# Patient Record
Sex: MALE | Race: WHITE | NOT HISPANIC OR LATINO | Employment: UNEMPLOYED | URBAN - METROPOLITAN AREA
[De-identification: names, ages, dates, MRNs, and addresses within clinical notes are randomized per-mention and may not be internally consistent; named-entity substitution may affect disease eponyms.]

---

## 2018-08-23 ENCOUNTER — HOSPITAL ENCOUNTER (INPATIENT)
Facility: HOSPITAL | Age: 48
LOS: 1 days | Discharge: HOME | End: 2018-08-24
Attending: HOSPITALIST | Admitting: INTERNAL MEDICINE

## 2018-08-23 PROBLEM — B19.10 HEPATITIS B: Status: ACTIVE | Noted: 2018-08-23

## 2018-08-23 PROBLEM — Z72.0 TOBACCO ABUSE: Status: ACTIVE | Noted: 2018-08-23

## 2018-08-23 PROBLEM — B16.9 ACUTE HEPATITIS B: Status: ACTIVE | Noted: 2018-08-23

## 2018-08-23 PROBLEM — R91.1 LUNG NODULE: Status: ACTIVE | Noted: 2018-08-23

## 2018-08-23 PROBLEM — R74.8 ELEVATED LIVER ENZYMES: Status: ACTIVE | Noted: 2018-08-23

## 2018-08-23 PROCEDURE — 99220 PR INITIAL OBSERVATION CARE/DAY 70 MINUTES: CPT | Performed by: HOSPITALIST

## 2018-08-23 PROCEDURE — G0378 HOSPITAL OBSERVATION PER HR: HCPCS

## 2018-08-23 RX ORDER — SODIUM CHLORIDE 0.9 % (FLUSH) 0.9 %
1-10 SYRINGE (ML) INJECTION AS NEEDED
Status: DISCONTINUED | OUTPATIENT
Start: 2018-08-23 | End: 2018-08-24 | Stop reason: HOSPADM

## 2018-08-23 RX ORDER — ONDANSETRON 2 MG/ML
4 INJECTION INTRAMUSCULAR; INTRAVENOUS EVERY 6 HOURS PRN
Status: DISCONTINUED | OUTPATIENT
Start: 2018-08-23 | End: 2018-08-24 | Stop reason: HOSPADM

## 2018-08-23 RX ORDER — NICOTINE 21 MG/24HR
1 PATCH, TRANSDERMAL 24 HOURS TRANSDERMAL EVERY 24 HOURS
Status: DISCONTINUED | OUTPATIENT
Start: 2018-08-23 | End: 2018-08-24 | Stop reason: HOSPADM

## 2018-08-23 RX ORDER — ONDANSETRON 4 MG/1
4 TABLET, FILM COATED ORAL EVERY 6 HOURS PRN
Status: DISCONTINUED | OUTPATIENT
Start: 2018-08-23 | End: 2018-08-24 | Stop reason: HOSPADM

## 2018-08-23 NOTE — PLAN OF CARE
Problem: Fluid Volume Deficit (Adult)  Goal: Optimal Fluid Balance  Outcome: Ongoing (interventions implemented as appropriate)   08/23/18 1747   Fluid Volume Deficit (Adult)   Optimal Fluid Balance achieves outcome

## 2018-08-23 NOTE — NURSING NOTE
"  ACC REVIEW REPORT: [facility]        PATIENT NAME: Darwin Olivera    PATIENT ID: [id number]    BED: S 335    BED TYPE: M/S    BED GIVEN TO: Daisy    TIME BED GIVEN: 1440    YOB: 1970    AGE: 48    GENDER: M    PREVIOUS ADMIT TO [facility]:     PREVIOUS ADMISSION DATE:     PATIENT CLASS:     TODAY'S DATE: 8/23/2018    TRANSFER DATE: 8-23-18    ETA: around 1645    TRANSFERRING FACILITY: [facility]    TRANSFERRING FACILITY PHONE # : [phone], [phone]    TRANSFERRING MD:     DATE/TIME REQUEST RECEIVED: [email or web address]    [facility] RN: Rubi Morales    REPORT FROM: Daisy FERGUSON REPORT TAKEN: 1440    DIAGNOSIS: N&V, dehydration    REASON FOR TRANSFER TO [facility]: higher level of care    TRANSPORTATION: EMS    CLINICAL REASON FOR TRANSFER TO [facility]: 48 y.o. Admitted to OS 8/21/18 with abdominal pain - found to have acute hepatitis B      CLINICAL INFORMATION    HEIGHT: 67\"    WEIGHT: 81.81 kg    ALLERGIES: KARINA GARVIN: no    INFECTIOUS DISEASE: hepatitis B    ISOLATION:     LAST VITAL SIGNS:  TIME: 1300  TEMP: 98.1  PULSE: 95  B/P: 130/75  RESP: 16    LAB INFORMATION: ast 778, alt 1831, alk phos 263, total bilirubin 5.7, albumin 2.9,  Ua: 3+ bilirubin, 2+ blood' hepatitis B +; INR \"good\"    CULTURE INFORMATION:     MEDS/IV FLUIDS: SL left ac (IVF @ 75cc/hr were stopped) - IV may be removed prior to departure  Pt had nebs; refused nicoderm patch  Is not on any home meds      CARDIAC SYSTEM:    CHEST PAIN: none reported    RHYTHM: not on a monitor    Is patient taking or has patient been given any drugs that could increase bleeding? no  (Plavix, Brilinta, Effient, Eliquis, Xarelto, Warfarin, Integrilin, Angiomax)      RESPIRATORY SYSTEM:    LUNG SOUNDS:  clear    OXYGEN: no    O2 SAT: 96% on RA    CNS/MUSCULOSKELETAL    ALERT AND ORIENTED:   yes    CNS/MUSCULOSKELETAL NOTES: ambulatory      GI//GY      ABDOMINAL PAIN: none reported today (resolved)    CT SCAN RESULTS: of abd/pelvix - neg; cystic kidney lesions < " 1cm    GI//GY NOTES: tolerating regular diet; abdomin non-distended    PAST MEDICAL HISTORY: shoulder and knee surgery, lipoma removal on his back    OTHER SYMPTOM NOTES: may be a little jaundiced      Guerda Morales RN  8/23/2018  2:47 PM

## 2018-08-23 NOTE — H&P
[facility] Medicine Services  HISTORY AND PHYSICAL    Patient Name: Darwin Olivera  : 1970  MRN: [id number]  Primary Care Physician: Magdaleno Bledsoe MD    Subjective   Subjective     Chief Complaint:  Elevated liver enzymes    HPI:  Darwin Olivera is a 48 y.o. male without significant past medical history.  He presented to [facility] ED on 18 with nausea, vomiting, and abdominal pain x 2 days.  He was noted to have elevated LFTs and total bili.  He was given 2 liters of normal saline with relief of symptoms.  Further testing showed positive for hepatitis B.  Labs and imaging reports from OSH were reviewed.  Chest x-ray showed no acute findings but possible faint nodule in left upper lobe.  They recommend follow-up enhanced CT chest.  Gallbladder ultrasound was normal.  CT scan of abdomen was unremarkable.  Labs on 18 showed total bili 3.7, , ALT 2109, , lipase 157.  H. pylori antibody positive.  Labs today at OSH were as follows total bili 5.7, , ALT 1831, and .  Patient was transferred to [facility] for GI consultation.      Currently, patient denies nausea, vomiting, chest pain, shortness of breath, constipation, diarrhea, or abdominal pain.  He is tolerating a regular diet.  He reports occasional lightheadedness and chronic cough.  He does report dark urine.  He denies IV drug use, no tattoos, or blood transfusion.  Patient does smoke 1 pack of cigarettes a day and reports occasional alcohol intake (6 pack of beer in 2 months).  Patient will be admitted to the hospital medicine service for further evaluation and management.  Plan to consult GI.      Review of Systems   Constitutional: Negative for chills and fever.   HENT: Negative for congestion, rhinorrhea, sneezing and sore throat.    Eyes: Negative.    Respiratory: Positive for cough (chronic). Negative for shortness of breath and wheezing.    Cardiovascular: Negative for chest pain,  palpitations and leg swelling.   Gastrointestinal: Positive for nausea (resolved) and vomiting (resolved). Negative for abdominal pain, constipation and diarrhea.   Genitourinary: Negative for difficulty urinating and dysuria.   Musculoskeletal: Negative.    Neurological: Positive for light-headedness. Negative for weakness and headaches.   Psychiatric/Behavioral: Negative.       Otherwise 10-system ROS reviewed and is negative except as mentioned in the HPI.    Personal History     No past medical history on file.    Past Surgical History:   Procedure Laterality Date   • KNEE SURGERY Left    • SHOULDER SURGERY Right        Family History: family history includes Colon cancer in his brother; Hypertension in his mother; No Known Problems in his father.     Social History:  reports that he has been smoking Cigarettes.  He has a 30.00 pack-year smoking history. He has never used smokeless tobacco. He reports that he drinks alcohol. He reports that he does not use drugs.  Social History     Social History Narrative    Lives in [place] with girlfriend and daughter    Currently laid off from work       Medications:  Available home medication information reviewed     No Known Allergies    Objective   Objective     Vital Signs:   Temp:  [98.1 °F (36.7 °C)] 98.1 °F (36.7 °C)  Heart Rate:  [100] 100  Resp:  [18] 18  BP: (134)/(96) 134/96        Physical Exam   Constitutional: No acute distress, awake, alert  HENT: NCAT, mucous membranes moist  Neck: Supple, no thyromegaly, no lymphadenopathy, trachea midline  Respiratory: Clear to auscultation bilaterally, nonlabored respirations   Cardiovascular: RRR, no murmurs, rubs, or gallops, palpable pedal pulses bilaterally  Gastrointestinal: Positive bowel sounds, soft, nontender, nondistended  Musculoskeletal: No bilateral ankle edema, no clubbing or cyanosis to extremities  Psychiatric: Appropriate affect, cooperative  Neurologic: Oriented x 3, strength symmetric in all  extremities, Cranial Nerves grossly intact to confrontation, speech clear  Skin: No rashes    Results Reviewed:  I have personally reviewed current lab, radiology, and data and agree.        Brief Urine Lab Results     None        No results found for: BNP  Imaging Results (last 24 hours)     ** No results found for the last 24 hours. **           Assessment/Plan   Assessment / Plan     Hospital Problem List     Elevated liver enzymes    Acute hepatitis B    Tobacco abuse    Lung nodule          Assessment & Plan:  Patient is a 48-year-old male with no past medical history.  Presented to [facility] on 8/21/18 with nausea, vomiting, and abdominal pain.  Found to have elevated LFTs, total bili, and hepatitis B positive.  He was transferred to [facility] for GI consultation.    Acute hepatitis B  --consult GI in the AM  --etiology unknown  --supportive care    Elevated liver enzymes  --due to above  --monitor  --labs in the AM    Lung nodule  --report from [facility] stated possible faint nodule in RICK, recommend enhanced CT of chest that can be done as an outpatient    Tobacco abuse  --nicotine patch  --smoking cessation education    DVT prophylaxis:  Lovenox    CODE STATUS:    Code Status and Medical Interventions:   Ordered at: 08/23/18 1840     Level Of Support Discussed With:    Patient     Code Status:    CPR     Medical Interventions (Level of Support Prior to Arrest):    Full       Admission Status:  I believe this patient meets OBSERVATION status, however if further evaluation or treatment plans warrant, status may change.  Based upon current information, I predict patient's care encounter to be less than or equal to 2 midnights.      Electronically signed by CAPRICE Valencia, 08/23/18, 6:21 PM.      Brief Attending Admission Attestation     I have seen and examined the patient, performing an independent face-to-face diagnostic evaluation with plan of care reviewed and developed with the advanced  practice clinician (APC).      Brief Summary Statement/HPI:   Darwin Olivera is a 48 y.o. male with no significant PMH presents from [facility] due to acute hepatitis B. He had presented there on 8/21 due to N/V and abdominal pain going on for 2 days. Labs showed significantly elevated LFT's. Acute hepatitis panel was positive for Hep B. RUQ US was unremarkable. In addition an H.pylori antibody was positive. Transferred to [facility] for GI evaluation. Currently patient has no complaints. Tolerating regular diet and has no pain or N/V.       Attending Physical Exam:  Constitutional: No acute distress, awake, alert  Eyes: PERRLA, sclerae anicteric, no conjunctival injection  HENT: NCAT, mucous membranes moist  Neck: Supple, no thyromegaly, no lymphadenopathy, trachea midline  Respiratory: Clear to auscultation bilaterally, nonlabored respirations   Cardiovascular: RRR, no murmurs, rubs, or gallops, palpable pedal pulses bilaterally  Gastrointestinal: Positive bowel sounds, soft, nontender, nondistended  Musculoskeletal: No bilateral ankle edema, no clubbing or cyanosis to extremities  Psychiatric: Appropriate affect, cooperative  Neurologic: Oriented x 3, strength symmetric in all extremities, Cranial Nerves grossly intact to confrontation, speech clear  Skin: No rashes        Brief Assessment/Plan :  See above for further detailed assessment and plan developed with APC which I have reviewed and/or edited.    47 yo M presents from OSH with acute hepatitis B.    PLAN:  --Clinically improved and LFT's trending down at OSH. Continue supportive care.  --GI consult in AM.  --Anticipate d/c home tomorrow.    Electronically signed by Lyndsey Ly MD, 08/23/18, 7:25 PM

## 2018-08-24 VITALS
TEMPERATURE: 97.9 F | DIASTOLIC BLOOD PRESSURE: 80 MMHG | OXYGEN SATURATION: 95 % | BODY MASS INDEX: 25.95 KG/M2 | HEIGHT: 69 IN | HEART RATE: 75 BPM | SYSTOLIC BLOOD PRESSURE: 119 MMHG | WEIGHT: 175.2 LBS | RESPIRATION RATE: 18 BRPM

## 2018-08-24 PROBLEM — A04.8 H. PYLORI INFECTION: Status: ACTIVE | Noted: 2018-08-24

## 2018-08-24 PROBLEM — B19.10 HEPATITIS B: Status: ACTIVE | Noted: 2018-08-24

## 2018-08-24 LAB
ALBUMIN SERPL-MCNC: 3.52 G/DL (ref 3.2–4.8)
ALBUMIN/GLOB SERPL: 1.1 G/DL (ref 1.5–2.5)
ALP SERPL-CCNC: 268 U/L (ref 25–100)
ALT SERPL W P-5'-P-CCNC: 1857 U/L (ref 7–40)
ANION GAP SERPL CALCULATED.3IONS-SCNC: 5 MMOL/L (ref 3–11)
AST SERPL-CCNC: 935 U/L (ref 0–33)
BASOPHILS # BLD AUTO: 0.1 10*3/MM3 (ref 0–0.2)
BASOPHILS NFR BLD AUTO: 1 % (ref 0–1)
BILIRUB SERPL-MCNC: 5.9 MG/DL (ref 0.3–1.2)
BUN BLD-MCNC: 11 MG/DL (ref 9–23)
BUN/CREAT SERPL: 12 (ref 7–25)
CALCIUM SPEC-SCNC: 9 MG/DL (ref 8.7–10.4)
CHLORIDE SERPL-SCNC: 105 MMOL/L (ref 99–109)
CO2 SERPL-SCNC: 23 MMOL/L (ref 20–31)
CREAT BLD-MCNC: 0.92 MG/DL (ref 0.6–1.3)
DEPRECATED RDW RBC AUTO: 49.5 FL (ref 37–54)
EOSINOPHIL # BLD AUTO: 0.33 10*3/MM3 (ref 0–0.3)
EOSINOPHIL NFR BLD AUTO: 3.4 % (ref 0–3)
ERYTHROCYTE [DISTWIDTH] IN BLOOD BY AUTOMATED COUNT: 17.1 % (ref 11.3–14.5)
GFR SERPL CREATININE-BSD FRML MDRD: 87 ML/MIN/1.73
GLOBULIN UR ELPH-MCNC: 3.2 GM/DL
GLUCOSE BLD-MCNC: 108 MG/DL (ref 70–100)
HCT VFR BLD AUTO: 44.1 % (ref 38.9–50.9)
HGB BLD-MCNC: 15.3 G/DL (ref 13.1–17.5)
IMM GRANULOCYTES # BLD: 0.03 10*3/MM3 (ref 0–0.03)
IMM GRANULOCYTES NFR BLD: 0.3 % (ref 0–0.6)
LYMPHOCYTES # BLD AUTO: 2.97 10*3/MM3 (ref 0.6–4.8)
LYMPHOCYTES NFR BLD AUTO: 30.6 % (ref 24–44)
MCH RBC QN AUTO: 27.6 PG (ref 27–31)
MCHC RBC AUTO-ENTMCNC: 34.7 G/DL (ref 32–36)
MCV RBC AUTO: 79.5 FL (ref 80–99)
MONOCYTES # BLD AUTO: 1.12 10*3/MM3 (ref 0–1)
MONOCYTES NFR BLD AUTO: 11.5 % (ref 0–12)
NEUTROPHILS # BLD AUTO: 5.2 10*3/MM3 (ref 1.5–8.3)
NEUTROPHILS NFR BLD AUTO: 53.5 % (ref 41–71)
PLATELET # BLD AUTO: 272 10*3/MM3 (ref 150–450)
PMV BLD AUTO: 10.7 FL (ref 6–12)
POTASSIUM BLD-SCNC: 4.1 MMOL/L (ref 3.5–5.5)
PROT SERPL-MCNC: 6.7 G/DL (ref 5.7–8.2)
RBC # BLD AUTO: 5.55 10*6/MM3 (ref 4.2–5.76)
SODIUM BLD-SCNC: 133 MMOL/L (ref 132–146)
WBC NRBC COR # BLD: 9.72 10*3/MM3 (ref 3.5–10.8)

## 2018-08-24 PROCEDURE — 80053 COMPREHEN METABOLIC PANEL: CPT | Performed by: NURSE PRACTITIONER

## 2018-08-24 PROCEDURE — 85025 COMPLETE CBC W/AUTO DIFF WBC: CPT | Performed by: NURSE PRACTITIONER

## 2018-08-24 PROCEDURE — 99239 HOSP IP/OBS DSCHRG MGMT >30: CPT | Performed by: INTERNAL MEDICINE

## 2018-08-24 PROCEDURE — 99253 IP/OBS CNSLTJ NEW/EST LOW 45: CPT | Performed by: INTERNAL MEDICINE

## 2018-08-24 RX ORDER — NICOTINE 21 MG/24HR
1 PATCH, TRANSDERMAL 24 HOURS TRANSDERMAL EVERY 24 HOURS
Qty: 28 EACH | Refills: 0 | Status: SHIPPED | OUTPATIENT
Start: 2018-08-24 | End: 2018-08-24

## 2018-08-24 RX ORDER — NICOTINE 21 MG/24HR
1 PATCH, TRANSDERMAL 24 HOURS TRANSDERMAL EVERY 24 HOURS
Qty: 28 EACH | Refills: 0 | Status: SHIPPED | OUTPATIENT
Start: 2018-08-24 | End: 2018-10-17 | Stop reason: HOSPADM

## 2018-08-24 RX ORDER — ONDANSETRON 4 MG/1
4 TABLET, FILM COATED ORAL EVERY 6 HOURS PRN
Qty: 18 TABLET | Refills: 0 | Status: SHIPPED | OUTPATIENT
Start: 2018-08-24 | End: 2018-08-24

## 2018-08-24 RX ORDER — ONDANSETRON 4 MG/1
4 TABLET, FILM COATED ORAL EVERY 6 HOURS PRN
Qty: 18 TABLET | Refills: 0 | Status: SHIPPED | OUTPATIENT
Start: 2018-08-24 | End: 2018-10-17 | Stop reason: HOSPADM

## 2018-08-24 NOTE — DISCHARGE SUMMARY
[facility] Medicine Services  DISCHARGE SUMMARY    Patient Name: Darwin Olivera  : 1970  MRN: [id number]    Date of Admission: 2018  Date of Discharge:  2018  Primary Care Physician: Magdaleno Bledsoe MD    Consults     Date and Time Order Name Status Description    2018 1840 Inpatient Gastroenterology Consult Completed         Hospital Course     Presenting Problem:   Acute hepatitis B [B16.9]  Hepatitis B [B19.10]    Active Hospital Problems    Diagnosis Date Noted   • **Acute hepatitis B [B16.9] 2018   • H. pylori infection [A04.8] 2018   • Elevated liver enzymes [R74.8] 2018   • Tobacco abuse [Z72.0] 2018   • Lung nodule [R91.1] 2018      Resolved Hospital Problems    Diagnosis Date Noted Date Resolved   No resolved problems to display.          Hospital Course:  Darwin Olivera is a 48 y.o. male with no significant PMH presents from [facility] due to acute hepatitis B. He had presented there on  due to N/V and abdominal pain going on for 2 days. Labs showed significantly elevated LFT's. Acute hepatitis panel was positive for Hep B surface antigen and core IgM.  INR was 1.1. RUQ US was unremarkable. In addition an H.pylori antibody was positive. He denies any IV drug use or new sexual partners.  Transferred to [facility] for GI evaluation. LFTs were improving/stable and he did not have significant symptoms.  He was evaluated by GI and will arrange for close outpatient follow up with repeat LFTs.  He will be started on Pylera x 14 days for H. Pylori.       Discharge Follow Up Recommendations for labs/diagnostics:  Follow up with GI 1-2 weeks with repeat LFTs  Follow up with PCP 1 week.  Would recommend checking HIV status at follow up.    Day of Discharge     HPI: Feels OK today other than some heartburn.    Review of Systems  Gen- No fevers, chills  CV- No chest pain, palpitations  Resp- No cough, dyspnea  GI- No N/V/D, abd  pain    Otherwise ROS is negative except as mentioned in the HPI.    Vital Signs:   Temp:  [97.7 °F (36.5 °C)-97.9 °F (36.6 °C)] 97.9 °F (36.6 °C)  Heart Rate:  [62-75] 75  Resp:  [16-18] 18  BP: (112-124)/(74-80) 119/80     Physical Exam:  Constitutional: No acute distress, awake, alert, sitting up in bed  HENT: NCAT, mucous membranes moist, + scleral icterus  Respiratory: Clear to auscultation bilaterally, respiratory effort normal   Cardiovascular: RRR, no murmurs, rubs, or gallops  Gastrointestinal: Positive bowel sounds, soft, nontender, nondistended  Musculoskeletal: No bilateral ankle edema  Psychiatric: Appropriate affect, cooperative  Neurologic: Oriented x 3, strength symmetric in all extremities, Cranial Nerves grossly intact to confrontation, speech clear  Skin: Jaundice    Pertinent  and/or Most Recent Results       Results from last 7 days  Lab Units 08/24/18  0601   WBC 10*3/mm3 9.72   HEMOGLOBIN g/dL 15.3   HEMATOCRIT % 44.1   PLATELETS 10*3/mm3 272   SODIUM mmol/L 133   POTASSIUM mmol/L 4.1   CHLORIDE mmol/L 105   CO2 mmol/L 23.0   BUN mg/dL 11   CREATININE mg/dL 0.92   GLUCOSE mg/dL 108*   CALCIUM mg/dL 9.0       Results from last 7 days  Lab Units 08/24/18  0601   BILIRUBIN mg/dL 5.9*   ALK PHOS U/L 268*   ALT (SGPT) U/L 1,857*   AST (SGOT) U/L 935*           Invalid input(s): TG, LDLCALC, LDLREALC      Brief Urine Lab Results     None          Microbiology Results Abnormal     None          Imaging Results (all)     None                           Discharge Details        Discharge Medications      New Medications      Instructions Start Date   bismuth-metronidazole-tetracycline 140-125-125 MG per capsule  Commonly known as:  PYLERA   3 capsules, Oral, 4 Times Daily Before Meals & Nightly      nicotine 21 MG/24HR patch  Commonly known as:  NICODERM CQ   1 patch, Transdermal, Every 24 Hours      ondansetron 4 MG tablet  Commonly known as:  ZOFRAN   4 mg, Oral, Every 6 Hours PRN                Discharge Disposition:  Home or Self Care      Code Status/Level of Support:  Code Status and Medical Interventions:   Ordered at: 08/23/18 1840     Level Of Support Discussed With:    Patient     Code Status:    CPR     Medical Interventions (Level of Support Prior to Arrest):    Full       No future appointments.    Additional Instructions for the Follow-ups that You Need to Schedule     Discharge Follow-up with PCP    As directed      Currently Documented PCP:  Magdaleno Bledsoe MD  PCP Phone Number:  [phone]    Follow Up Details:  1 week         Discharge Follow-up with Specialty: [facility]; 1 Week    As directed      Specialty:  [facility]    Follow Up:  1 Week    Follow Up Details:  1-2 weeks for repeat liver enzymes               Time Spent on Discharge:  33 minutes    Electronically signed by Britt Canseco MD, 08/24/18, 9:06 PM.

## 2018-08-24 NOTE — PAYOR COMM NOTE
"Darwin Olivera  (48 y.o. Male)     Date of Birth Social Security Number Address Home Phone MRN    1970  [id number] [facility] [address] [phone] [id number]    Restoration Marital Status          None        Admission Date Admission Type Admitting Provider Attending Provider Department, Room/Bed    18 Urgent Lyndsey Ly MD Brown, Hannah, MD [facility] 3E, S335/1    Discharge Date Discharge Disposition Discharge Destination                       Attending Provider:  Britt Canseco MD    Allergies:  No Known Allergies    Isolation:  None   Infection:  None   Code Status:  CPR    Ht:  175 cm (68.9\")   Wt:  79.5 kg (175 lb 3.2 oz)    Admission Cmt:  None   Principal Problem:  None                Active Insurance as of 2018     Primary Coverage     Payor Plan Insurance Group Employer/Plan Group    [employer] [place] [employer] [place]      Payor Plan Address Payor Plan Phone Number Effective From Effective To    [address] [address] [phone] 2018     [facility] [id number]       Subscriber Name Subscriber Birth Date Member ID       DARWIN OLIVERA 1970 [id number]                 Emergency Contacts      (Rel.) Home Phone Work Phone Mobile Phone    Jam Olivera [phone] -- --               History & Physical      Lyndsey Ly MD at 2018  6:21 PM              [facility] Medicine Services  HISTORY AND PHYSICAL    Patient Name: Darwin Olivera  : 1970  MRN: [id number]  Primary Care Physician: Magdaleno Bledsoe MD    Subjective   Subjective     Chief Complaint:  Elevated liver enzymes    HPI:  Darwin Olivera is a 48 y.o. male without significant past medical history.  He presented to [facility] ED on 18 with nausea, vomiting, and abdominal pain x 2 days.  He was noted to have elevated LFTs and total bili.  He was given 2 liters of normal saline with relief of symptoms.  Further testing showed positive for " hepatitis B.  Labs and imaging reports from OSH were reviewed.  Chest x-ray showed no acute findings but possible faint nodule in left upper lobe.  They recommend follow-up enhanced CT chest.  Gallbladder ultrasound was normal.  CT scan of abdomen was unremarkable.  Labs on 8/21/18 showed total bili 3.7, , ALT 2109, , lipase 157.  H. pylori antibody positive.  Labs today at OSH were as follows total bili 5.7, , ALT 1831, and .  Patient was transferred to [facility] for GI consultation.      Currently, patient denies nausea, vomiting, chest pain, shortness of breath, constipation, diarrhea, or abdominal pain.  He is tolerating a regular diet.  He reports occasional lightheadedness and chronic cough.  He does report dark urine.  He denies IV drug use, no tattoos, or blood transfusion.  Patient does smoke 1 pack of cigarettes a day and reports occasional alcohol intake (6 pack of beer in 2 months).  Patient will be admitted to the hospital medicine service for further evaluation and management.  Plan to consult GI.      Review of Systems   Constitutional: Negative for chills and fever.   HENT: Negative for congestion, rhinorrhea, sneezing and sore throat.    Eyes: Negative.    Respiratory: Positive for cough (chronic). Negative for shortness of breath and wheezing.    Cardiovascular: Negative for chest pain, palpitations and leg swelling.   Gastrointestinal: Positive for nausea (resolved) and vomiting (resolved). Negative for abdominal pain, constipation and diarrhea.   Genitourinary: Negative for difficulty urinating and dysuria.   Musculoskeletal: Negative.    Neurological: Positive for light-headedness. Negative for weakness and headaches.   Psychiatric/Behavioral: Negative.       Otherwise 10-system ROS reviewed and is negative except as mentioned in the HPI.    Personal History     No past medical history on file.    Past Surgical History:   Procedure Laterality Date   • KNEE SURGERY Left    •  SHOULDER SURGERY Right        Family History: family history includes Colon cancer in his brother; Hypertension in his mother; No Known Problems in his father.     Social History:  reports that he has been smoking Cigarettes.  He has a 30.00 pack-year smoking history. He has never used smokeless tobacco. He reports that he drinks alcohol. He reports that he does not use drugs.  Social History     Social History Narrative    Lives in [place] with girlfriend and daughter    Currently laid off from work       Medications:  Available home medication information reviewed     No Known Allergies    Objective   Objective     Vital Signs:   Temp:  [98.1 °F (36.7 °C)] 98.1 °F (36.7 °C)  Heart Rate:  [100] 100  Resp:  [18] 18  BP: (134)/(96) 134/96        Physical Exam   Constitutional: No acute distress, awake, alert  HENT: NCAT, mucous membranes moist  Neck: Supple, no thyromegaly, no lymphadenopathy, trachea midline  Respiratory: Clear to auscultation bilaterally, nonlabored respirations   Cardiovascular: RRR, no murmurs, rubs, or gallops, palpable pedal pulses bilaterally  Gastrointestinal: Positive bowel sounds, soft, nontender, nondistended  Musculoskeletal: No bilateral ankle edema, no clubbing or cyanosis to extremities  Psychiatric: Appropriate affect, cooperative  Neurologic: Oriented x 3, strength symmetric in all extremities, Cranial Nerves grossly intact to confrontation, speech clear  Skin: No rashes    Results Reviewed:  I have personally reviewed current lab, radiology, and data and agree.        Brief Urine Lab Results     None        No results found for: BNP  Imaging Results (last 24 hours)     ** No results found for the last 24 hours. **           Assessment/Plan   Assessment / Plan     Hospital Problem List     Elevated liver enzymes    Acute hepatitis B    Tobacco abuse    Lung nodule          Assessment & Plan:  Patient is a 48-year-old male with no past medical history.  Presented to [facility] on 8/21/18 with nausea, vomiting, and abdominal pain.  Found to have elevated LFTs, total bili, and hepatitis B positive.  He was transferred to [facility] for GI consultation.    Acute hepatitis B  --consult GI in the AM  --etiology unknown  --supportive care    Elevated liver enzymes  --due to above  --monitor  --labs in the AM    Lung nodule  --report from [facility] stated possible faint nodule in RICK, recommend enhanced CT of chest that can be done as an outpatient    Tobacco abuse  --nicotine patch  --smoking cessation education    DVT prophylaxis:  Lovenox    CODE STATUS:    Code Status and Medical Interventions:   Ordered at: 08/23/18 1840     Level Of Support Discussed With:    Patient     Code Status:    CPR     Medical Interventions (Level of Support Prior to Arrest):    Full       Admission Status:  I believe this patient meets OBSERVATION status, however if further evaluation or treatment plans warrant, status may change.  Based upon current information, I predict patient's care encounter to be less than or equal to 2 midnights.      Electronically signed by CAPRICE Valencia, 08/23/18, 6:21 PM.      Brief Attending Admission Attestation     I have seen and examined the patient, performing an independent face-to-face diagnostic evaluation with plan of care reviewed and developed with the advanced practice clinician (APC).      Brief Summary Statement/HPI:   Darwin Olivera is a 48 y.o. male with no significant PMH presents from [facility] due to acute hepatitis B. He had presented there on 8/21 due to N/V and abdominal pain going on for 2 days. Labs showed significantly elevated LFT's. Acute hepatitis panel was positive for Hep B. RUQ US was unremarkable. In addition an H.pylori antibody was positive. Transferred to [facility] for GI evaluation. Currently patient has no complaints. Tolerating regular diet and has no pain or N/V.       Attending Physical Exam:  Constitutional: No acute  distress, awake, alert  Eyes: PERRLA, sclerae anicteric, no conjunctival injection  HENT: NCAT, mucous membranes moist  Neck: Supple, no thyromegaly, no lymphadenopathy, trachea midline  Respiratory: Clear to auscultation bilaterally, nonlabored respirations   Cardiovascular: RRR, no murmurs, rubs, or gallops, palpable pedal pulses bilaterally  Gastrointestinal: Positive bowel sounds, soft, nontender, nondistended  Musculoskeletal: No bilateral ankle edema, no clubbing or cyanosis to extremities  Psychiatric: Appropriate affect, cooperative  Neurologic: Oriented x 3, strength symmetric in all extremities, Cranial Nerves grossly intact to confrontation, speech clear  Skin: No rashes        Brief Assessment/Plan :  See above for further detailed assessment and plan developed with APC which I have reviewed and/or edited.    49 yo M presents from OSH with acute hepatitis B.    PLAN:  --Clinically improved and LFT's trending down at OSH. Continue supportive care.  --GI consult in AM.  --Anticipate d/c home tomorrow.    Electronically signed by Lyndsey Ly MD, 08/23/18, 7:25 PM             Electronically signed by Lyndsey Ly MD at 8/23/2018  7:25 PM       Lab Results (all)     Procedure Component Value Units Date/Time    Comprehensive Metabolic Panel [[id number]]  (Abnormal) Collected:  08/24/18 0601    Specimen:  Blood Updated:  08/24/18 0721     Glucose 108 (H) mg/dL      BUN 11 mg/dL      Creatinine 0.92 mg/dL      Sodium 133 mmol/L      Potassium 4.1 mmol/L      Chloride 105 mmol/L      CO2 23.0 mmol/L      Calcium 9.0 mg/dL      Total Protein 6.7 g/dL      Albumin 3.52 g/dL      ALT (SGPT) 1,857 (H) U/L      AST (SGOT) 935 (H) U/L      Alkaline Phosphatase 268 (H) U/L      Total Bilirubin 5.9 (H) mg/dL      eGFR Non African Amer 87 mL/min/1.73      Globulin 3.2 gm/dL      A/G Ratio 1.1 (L) g/dL      BUN/Creatinine Ratio 12.0     Anion Gap 5.0 mmol/L     Narrative:       National [employer]  Guidelines    Stage     Description        GFR  1         Normal or High     90+  2         Mild decrease      60-89  3         Moderate decrease  30-59  4         Severe decrease    15-29  5         Kidney failure     <15    CBC Auto Differential [[id number]]  (Abnormal) Collected:  08/24/18 0601    Specimen:  Blood Updated:  08/24/18 0632     WBC 9.72 10*3/mm3      RBC 5.55 10*6/mm3      Hemoglobin 15.3 g/dL      Hematocrit 44.1 %      MCV 79.5 (L) fL      MCH 27.6 pg      MCHC 34.7 g/dL      RDW 17.1 (H) %      RDW-SD 49.5 fl      MPV 10.7 fL      Platelets 272 10*3/mm3      Neutrophil % 53.5 %      Lymphocyte % 30.6 %      Monocyte % 11.5 %      Eosinophil % 3.4 (H) %      Basophil % 1.0 %      Immature Grans % 0.3 %      Neutrophils, Absolute 5.20 10*3/mm3      Lymphocytes, Absolute 2.97 10*3/mm3      Monocytes, Absolute 1.12 (H) 10*3/mm3      Eosinophils, Absolute 0.33 (H) 10*3/mm3      Basophils, Absolute 0.10 10*3/mm3      Immature Grans, Absolute 0.03 10*3/mm3

## 2018-08-24 NOTE — PROGRESS NOTES
Discharge Planning Assessment  Psychiatric     Patient Name: Darwin Olivera  MRN: [id number]  Today's Date: 8/24/2018    Admit Date: 8/23/2018          Discharge Needs Assessment     Row Name 08/24/18 1225       Living Environment    Lives With significant other;child(chelsea), dependent;other (see comments)    Current Living Arrangements home/apartment/condo    Primary Care Provided by self    Provides Primary Care For child(chelsea)    Family Caregiver if Needed significant other    Quality of Family Relationships helpful;supportive    Able to Return to Prior Arrangements yes       Transition Planning    Patient/Family Anticipates Transition to home with family    Patient/Family Anticipated Services at Transition none       Discharge Needs Assessment    Readmission Within the Last 30 Days other (see comments)   Transferred to [facility] from [facility]    Concerns to be Addressed no discharge needs identified;denies needs/concerns at this time    Equipment Currently Used at Home none    Equipment Needed After Discharge none            Discharge Plan     Row Name 08/24/18 5316       Plan    Plan Home    Patient/Family in Agreement with Plan yes    Plan Comments Met with patient in the room to initiate discharge planning. Patient lives with his girlfriend, her mother, and his 3-y.o. daughter in a home in [place]. He is independent with ADLs and mobility and does not use any DME. His goal is home at discharge and family will provide his ride. Patient does not anticipate any discharge needs at this time. CM will continue to follow.         Destination     No service coordination in this encounter.      Durable Medical Equipment     No service coordination in this encounter.      Dialysis/Infusion     No service coordination in this encounter.      Home Medical Care     No service coordination in this encounter.      Social Care     No service coordination in this encounter.                Demographic Summary     Row Name 08/24/18  1224       General Information    Admission Type observation    Referral Source admission list    Reason for Consult discharge planning    General Information Comments PCP is Magdaleno Bledsoe       Contact Information    Permission Granted to Share Info With ;family/designee   brotherJam            Functional Status     Row Name 08/24/18 1225       Functional Status    Usual Activity Tolerance good    Current Activity Tolerance good       Functional Status, IADL    Medications independent    Meal Preparation independent    Housekeeping independent    Laundry independent    Shopping independent       Employment/    Employment/ Comments Confirmed with patient that he has medical and rx coverage through [employer] with no issues affording medications.            Psychosocial    No documentation.           Abuse/Neglect    No documentation.           Legal    No documentation.           Substance Abuse    No documentation.           Patient Forms    No documentation.         Maria Del Carmen Umanzor

## 2018-08-24 NOTE — CONSULTS
[facility]    Requesting Provider: [facility]   Primary Care Phyisician: Magdaleno Bledsoe MD  Primary GI:none     Reason for Consultation: hepatitis B    Chief complaint nausea and vomiting; fatigue    History of present illness:  49 yo with no significant GI history.  Patient denies any hx of IVDU or recent transfusion who was found in [facility] to have elevated liver tests with positive hepatitis B surface antigen and hepatitis B core IgM positive.  Patient waited there for two days before being transferred here to [facility] [place], operated by [facility].  Patient since then has been doing well with fluid resuscitation.  He has no nause or vomiting.  He is eating and has no abdominal pain.  Patient denies any HIV, recent tatoos and reports has a girlfriend who has was tested for hepatitis B and DID not have the virus as of today.  Patient would like to go home at this time with follow up.      History    enoxaparin 40 mg Subcutaneous Q24H   nicotine 1 patch Transdermal Q24H   , Continuous Infusions:    and PRN Meds:  ondansetron **OR** ondansetron  •  sodium chloride    Objective     ROS: Review of Systems    PAST MED HX: Pt  has no past medical history on file.  PAST SURG HX: Pt  has a past surgical history that includes Knee surgery (Left) and Shoulder surgery (Right).  FAM HX: family history includes Colon cancer in his brother; Hypertension in his mother; No Known Problems in his father.  SOC HX: Pt  reports that he has been smoking Cigarettes.  He has a 30.00 pack-year smoking history. He has never used smokeless tobacco. He reports that he drinks alcohol. He reports that he does not use drugs.    Current Facility-Administered Medications:   •  enoxaparin (LOVENOX) syringe 40 mg, 40 mg, Subcutaneous, Q24H, Jennifer Estrella, APRN  •  nicotine (NICODERM CQ) 21 MG/24HR patch 1 patch, 1 patch, Transdermal, Q24H, Jennifer Estrella, APRN  •  ondansetron (ZOFRAN) tablet 4 mg, 4 mg, Oral, Q6H PRN **OR** ondansetron (ZOFRAN) injection 4 mg, 4  "mg, Intravenous, Q6H PRN, Jennifer Estrella, APRN  •  sodium chloride 0.9 % flush 1-10 mL, 1-10 mL, Intravenous, PRN, Jennifer Estrella, APRN    Current Outpatient Prescriptions:   •  bismuth-metronidazole-tetracycline (PYLERA) 140-125-125 MG per capsule, Take 3 capsules by mouth 4 (Four) Times a Day Before Meals & at Bedtime for 14 days., Disp: 168 capsule, Rfl: 0  •  nicotine (NICODERM CQ) 21 MG/24HR patch, Place 1 patch on the skin as directed by provider Daily., Disp: 28 each, Rfl: 0  •  ondansetron (ZOFRAN) 4 MG tablet, Take 1 tablet by mouth Every 6 (Six) Hours As Needed for Nausea or Vomiting., Disp: 18 tablet, Rfl: 0    Physical Exam  Wt Readings from Last 3 Encounters:   08/23/18 79.5 kg (175 lb 3.2 oz)   ,body mass index is 25.95 kg/m².,@FLOWAMB(6)@,@FLOWAMB(5)@,@FLOWAMB(8)@   General Well developed; well nourished; no acute distress.   ENT Good dentition.  Oral mucosa pink & moist without thrush or lesions.    Neck Neck supple; trachea midline. No thyromegaly  Resp CTA; no rhonchi, rales, or wheezes.  Respiration effort normal  CV RRR; normal S1, S2; no M/R/G. No lower extremity edema  GI Abd soft, NT, ND, normal active bowel sounds.  No HSM.  No abd hernia  Skin No rash; no lesions; no bruises.  Skin turgor normal  Musc No clubbing; no cyanosis.  Gait steady  Psych Oriented to time, place, and person.  Appropriate affect    Vital Signs   Blood pressure 119/80, pulse 75, temperature 97.9 °F (36.6 °C), temperature source Oral, resp. rate 18, height 175 cm (68.9\"), weight 79.5 kg (175 lb 3.2 oz), SpO2 95 %.    Results Review:   I reviewed the patient's new clinical results.    LABS:      Lab Results (last 24 hours)     Procedure Component Value Units Date/Time    Comprehensive Metabolic Panel [[id number]]  (Abnormal) Collected:  08/24/18 0601    Specimen:  Blood Updated:  08/24/18 0721     Glucose 108 (H) mg/dL      BUN 11 mg/dL      Creatinine 0.92 mg/dL      Sodium 133 mmol/L      Potassium 4.1 mmol/L      " Chloride 105 mmol/L      CO2 23.0 mmol/L      Calcium 9.0 mg/dL      Total Protein 6.7 g/dL      Albumin 3.52 g/dL      ALT (SGPT) 1,857 (H) U/L      AST (SGOT) 935 (H) U/L      Alkaline Phosphatase 268 (H) U/L      Total Bilirubin 5.9 (H) mg/dL      eGFR Non African Amer 87 mL/min/1.73      Globulin 3.2 gm/dL      A/G Ratio 1.1 (L) g/dL      BUN/Creatinine Ratio 12.0     Anion Gap 5.0 mmol/L     Narrative:       National Kidney Foundation Guidelines    Stage     Description        GFR  1         Normal or High     90+  2         Mild decrease      60-89  3         Moderate decrease  30-59  4         Severe decrease    15-29  5         Kidney failure     <15    CBC Auto Differential [[id number]]  (Abnormal) Collected:  08/24/18 0601    Specimen:  Blood Updated:  08/24/18 0632     WBC 9.72 10*3/mm3      RBC 5.55 10*6/mm3      Hemoglobin 15.3 g/dL      Hematocrit 44.1 %      MCV 79.5 (L) fL      MCH 27.6 pg      MCHC 34.7 g/dL      RDW 17.1 (H) %      RDW-SD 49.5 fl      MPV 10.7 fL      Platelets 272 10*3/mm3      Neutrophil % 53.5 %      Lymphocyte % 30.6 %      Monocyte % 11.5 %      Eosinophil % 3.4 (H) %      Basophil % 1.0 %      Immature Grans % 0.3 %      Neutrophils, Absolute 5.20 10*3/mm3      Lymphocytes, Absolute 2.97 10*3/mm3      Monocytes, Absolute 1.12 (H) 10*3/mm3      Eosinophils, Absolute 0.33 (H) 10*3/mm3      Basophils, Absolute 0.10 10*3/mm3      Immature Grans, Absolute 0.03 10*3/mm3         RADIOLOGY:  Imaging Results (last 24 hours)     ** No results found for the last 24 hours. **          ASSESSMENTS/PLANS   1.  Abnormal liver function test  2.  Hepatitis B (acute)    Patient does not show any signs or symptoms of decompensation.  Patient counseled on signs and symptoms to look for and to seek medicatl attention should he worsen.  Patient does not have abnormal coagulation or encephalopathy.  Patient may resolve virus on his own.  -I do not recommend medication for now  -Recommend close  follow up with GI to make sure that other liver disease and HIV ruled out.  Further labs can be done outpatient  -Discussed with Dr. Canseco    I discussed the patients findings and my recommendations with patient, nursing staff and consulting provider    Sabi Warren MD  08/24/18  6:54 PM

## 2018-10-17 ENCOUNTER — OFFICE VISIT (OUTPATIENT)
Dept: GASTROENTEROLOGY | Facility: CLINIC | Age: 48
End: 2018-10-17

## 2018-10-17 ENCOUNTER — LAB (OUTPATIENT)
Dept: LAB | Facility: HOSPITAL | Age: 48
End: 2018-10-17

## 2018-10-17 VITALS
HEART RATE: 89 BPM | RESPIRATION RATE: 14 BRPM | TEMPERATURE: 98.1 F | OXYGEN SATURATION: 99 % | SYSTOLIC BLOOD PRESSURE: 112 MMHG | WEIGHT: 186 LBS | HEIGHT: 69 IN | BODY MASS INDEX: 27.55 KG/M2 | DIASTOLIC BLOOD PRESSURE: 88 MMHG

## 2018-10-17 DIAGNOSIS — B18.1 HEPATITIS B, CHRONIC (HCC): Primary | ICD-10-CM

## 2018-10-17 DIAGNOSIS — B18.1 HEPATITIS B, CHRONIC (HCC): ICD-10-CM

## 2018-10-17 LAB
ALBUMIN SERPL-MCNC: 4.7 G/DL (ref 3.2–4.8)
ALBUMIN/GLOB SERPL: 1.7 G/DL (ref 1.5–2.5)
ALP SERPL-CCNC: 105 U/L (ref 25–100)
ALT SERPL W P-5'-P-CCNC: 33 U/L (ref 7–40)
AMPHET+METHAMPHET UR QL: NEGATIVE
AMPHETAMINES UR QL: NEGATIVE
ANION GAP SERPL CALCULATED.3IONS-SCNC: 7 MMOL/L (ref 3–11)
AST SERPL-CCNC: 31 U/L (ref 0–33)
BARBITURATES UR QL SCN: NEGATIVE
BENZODIAZ UR QL SCN: NEGATIVE
BILIRUB SERPL-MCNC: 0.6 MG/DL (ref 0.3–1.2)
BUN BLD-MCNC: 10 MG/DL (ref 9–23)
BUN/CREAT SERPL: 10.5 (ref 7–25)
BUPRENORPHINE SERPL-MCNC: NEGATIVE NG/ML
CALCIUM SPEC-SCNC: 9.6 MG/DL (ref 8.7–10.4)
CANNABINOIDS SERPL QL: NEGATIVE
CHLORIDE SERPL-SCNC: 100 MMOL/L (ref 99–109)
CO2 SERPL-SCNC: 28 MMOL/L (ref 20–31)
COCAINE UR QL: NEGATIVE
CREAT BLD-MCNC: 0.95 MG/DL (ref 0.6–1.3)
GFR SERPL CREATININE-BSD FRML MDRD: 84 ML/MIN/1.73
GLOBULIN UR ELPH-MCNC: 2.8 GM/DL
GLUCOSE BLD-MCNC: 87 MG/DL (ref 70–100)
HAV IGM SERPL QL IA: ABNORMAL
HBV CORE IGM SERPL QL IA: REACTIVE
HBV SURFACE AB SER RIA-ACNC: NORMAL
HBV SURFACE AG SERPL QL IA: REACTIVE
HCV AB SER DONR QL: ABNORMAL
HIV1+2 AB SER QL: NORMAL
METHADONE UR QL SCN: NEGATIVE
OPIATES UR QL: NEGATIVE
OXYCODONE UR QL SCN: POSITIVE
PCP UR QL SCN: NEGATIVE
POTASSIUM BLD-SCNC: 4.3 MMOL/L (ref 3.5–5.5)
PROPOXYPH UR QL: NEGATIVE
PROT SERPL-MCNC: 7.5 G/DL (ref 5.7–8.2)
SODIUM BLD-SCNC: 135 MMOL/L (ref 132–146)
TRICYCLICS UR QL SCN: NEGATIVE

## 2018-10-17 PROCEDURE — 87341 HEP B SURFACE AG NEUTRLZJ IA: CPT

## 2018-10-17 PROCEDURE — 99214 OFFICE O/P EST MOD 30 MIN: CPT | Performed by: NURSE PRACTITIONER

## 2018-10-17 PROCEDURE — 86692 HEPATITIS DELTA AGENT ANTBDY: CPT

## 2018-10-17 PROCEDURE — 86708 HEPATITIS A ANTIBODY: CPT

## 2018-10-17 PROCEDURE — 86706 HEP B SURFACE ANTIBODY: CPT

## 2018-10-17 PROCEDURE — 86704 HEP B CORE ANTIBODY TOTAL: CPT

## 2018-10-17 PROCEDURE — 86707 HEPATITIS BE ANTIBODY: CPT

## 2018-10-17 PROCEDURE — 87517 HEPATITIS B DNA QUANT: CPT

## 2018-10-17 PROCEDURE — 80074 ACUTE HEPATITIS PANEL: CPT

## 2018-10-17 PROCEDURE — 80306 DRUG TEST PRSMV INSTRMNT: CPT

## 2018-10-17 PROCEDURE — G0432 EIA HIV-1/HIV-2 SCREEN: HCPCS

## 2018-10-17 PROCEDURE — 87912 NFCT AGT GNTYP ALYS HEP B: CPT

## 2018-10-17 PROCEDURE — 36415 COLL VENOUS BLD VENIPUNCTURE: CPT

## 2018-10-17 PROCEDURE — 87350 HEPATITIS BE AG IA: CPT

## 2018-10-17 PROCEDURE — 80053 COMPREHEN METABOLIC PANEL: CPT

## 2018-10-17 RX ORDER — OXYCODONE AND ACETAMINOPHEN 7.5; 325 MG/1; MG/1
1 TABLET ORAL EVERY 6 HOURS PRN
COMMUNITY

## 2018-10-17 NOTE — PROGRESS NOTES
GASTROENTEROLOGY OUTPATIENT ESTABLISHED PATIENT NOTE  Patient: DARWIN OLIVERA : 1970  Date of Service: 10/17/2018  CC: Follow-up (Elevated Liver )    Assessment/Plan                                             ASSESSMENT & PLANS     Darwin was seen today for follow-up.    Diagnoses and all orders for this visit:    Hepatitis B, chronic (CMS/HCC)  -     Hepatitis B E Antibody; Future  -     Hepatitis B E Antigen; Future  -     Hepatitis B Core Antibody, Total; Future  -     Comprehensive Metabolic Panel; Future  -     HBV Genotype; Future  -     Hepatitis B DNA, Quantitative, PCR; Future  -     Hepatitis Delta Virus; Future  -     HIV-1 / O / 2 Ag / Antibody 4th Generation; Future  -     Urine Drug Screen - Urine, Clean Catch; Future  -     US Liver; Future  -     Hepatitis A Antibody, Total; Future  -     Hepatitis B Surface Antibody; Future  -     Hepatitis Panel, Acute; Future    Follow Up:Return in about 1 month (around 2018).      DISCUSSION: The above plan was delineated in details with patient and all questions and concerns were answered.  Patient is also given contact information.  Patient is to return as scheduled or sooner if new problems arise.   Subjective                                                     SUBJECTIVE   History of Present Illness  Mr. Darwin Olivera is a 48 y.o. male who is here for Follow-up (Elevated Liver )  Was told that pt had Hep B during recent hospitalization  1 tattoo done unprofessionally when pt was 15 y/o   No prior colonoscopy  No change in bowel habits. Pt denies dark black stools or bright red blood in the stools, in the toilet bowl, or on the toilet tissue.  No diarrhea or constipation.  Stool character and consistency have been normal.  Wants to put off colonoscopy at this time    ROS:Review of Systems   Constitutional: Negative.         Pt currently or recently takes NSAIDS ( (i.e Ibuprofen, Aleve, Advil, Exedrin, BC Powder, diclofenac, meloxicam, &  Naproxen, etc)? No    Pt currently or recently takes abx  No   HENT: Negative.    Eyes: Negative.    Respiratory: Negative.    Cardiovascular: Negative.    Gastrointestinal: Negative.    Endocrine: Negative.    Genitourinary: Negative.    Musculoskeletal: Negative.    Skin: Negative.    Allergic/Immunologic: Negative.    Neurological: Negative.    Hematological: Negative.    Psychiatric/Behavioral: Negative.    All other systems reviewed and are negative.    Objective                                                           OBJECTIVE   Allergy: Pt has No Known Allergies.  MEDS:   Current Outpatient Prescriptions:   •  oxyCODONE-acetaminophen (PERCOCET) 7.5-325 MG per tablet, Take 1 tablet by mouth Every 6 (Six) Hours As Needed., Disp: , Rfl:   Lab Results   Component Value Date    WBC 9.72 08/24/2018    EOSABS 0.33 (H) 08/24/2018    HGB 15.3 08/24/2018    HCT 44.1 08/24/2018    MCV 79.5 (L) 08/24/2018    MCHC 34.7 08/24/2018     08/24/2018     Lab Results   Component Value Date     08/24/2018    K 4.1 08/24/2018     08/24/2018    CO2 23.0 08/24/2018    BUN 11 08/24/2018    CREATININE 0.92 08/24/2018    EGFRIFNONA 87 08/24/2018    GLUCOSE 108 (H) 08/24/2018    CALCIUM 9.0 08/24/2018    ANIONGAP 5.0 08/24/2018     Lab Results   Component Value Date     (H) 08/24/2018    ALT 1,857 (H) 08/24/2018    ALKPHOS 268 (H) 08/24/2018    BILITOT 5.9 (H) 08/24/2018    ALBUMIN 3.52 08/24/2018     Wt Readings from Last 5 Encounters:   10/17/18 84.4 kg (186 lb)   08/23/18 79.5 kg (175 lb 3.2 oz)   body mass index is 27.55 kg/m².,Temp: 98.1 °F (36.7 °C),BP: 112/88,Heart Rate: 89   Physical Exam  General Well developed; well nourished; no acute distress.   ENT Oral mucosa pink and moist without thrush or lesions.    GI Abd soft, NT, ND, normal active bowel sounds.  No HSM.  No abd hernia    Pt care team: Elenita VASQUES & Lacey Carlton MA  [facility]--Gastroenterology  [phone]    CC: , Magdaleno Hernandez MD   [address] [facility]  / [facility] [address] FAX:[phone]

## 2018-10-17 NOTE — PATIENT INSTRUCTIONS
Hepatitis B  Hepatitis B is a viral infection of the liver. There are two kinds of hepatitis B:  · Acute hepatitis B. This lasts for six months or less.  · Chronic hepatitis B. This lasts for more than six months. Chronic hepatitis B can lead to liver failure, scarring of the liver (cirrhosis), or liver cancer.    Acute hepatitis B can turn into chronic hepatitis B. Most adults with acute hepatitis B do not develop chronic hepatitis B. Infants and young children who get hepatitis B are more likely to develop chronic hepatitis B than adults. The hepatitis B vaccine can prevent this condition.  What are the causes?  This condition is caused by the hepatitis B virus (HBV). The virus may spread from person to person (is contagious) through:  · Blood.  · Childbirth. A woman who has hepatitis B can pass it to her baby during birth.  · Bodily fluids such as breast milk, tears, semen, vaginal fluids, and saliva.    What increases the risk?  The following factors may make you more likely to develop this condition:  · Having contact with unclean (contaminated) needles or syringes. This may result from:  ? Acupuncture.  ? Tattooing.  ? Body piercing.  ? Injecting drugs.  · Having unprotected sex with someone who is infected.  · Living with or having close contact with a person who has hepatitis B.  · Working in a job that involves contact with blood or bodily fluids, such as health care.  · Traveling to a country that has many cases of hepatitis B.  · Being on treatment to filter your blood (kidney dialysis).  · Having a history of blood transfusions or organ transplants.    What are the signs or symptoms?  Symptoms of this condition may include:  · Loss of appetite.  · Fatigue.  · Nausea.  · Vomiting.  · Stomach pain.  · Dark yellow urine.  · Yellowish skin and eyes (jaundice).  · Fever.  · Light-colored or gray bowel movements.  · Joint pain.    In some cases, you may not have any symptoms.  How is this diagnosed?  This  condition is diagnosed based on:  · A physical exam.  · Your medical history.  · Blood tests.    How is this treated?  Treatment for chronic hepatitis B may include antiviral medicine. This medicine may help:  · Lower your risk of liver failure, cirrhosis, or liver cancer.  · Lower your ability to infect others with hepatitis B.    You will need to avoid alcohol and medicines that can be hard for the liver to break down (metabolize). This helps prevent further injury to your liver.  Follow these instructions at home:  Medicines  · Take over-the-counter and prescription medicines only as told by your health care provider.  · Take your antiviral medicine as told by your health care provider. Do not stop taking the antiviral even if you start to feel better.  · Do not take any over-the-counter medicines that contain acetaminophen.  · Do not take any new medicines, including over-the-counter medicines for fever or pain, unless approved by your health care provider.  Activity  · Rest as needed.  · Do not have sex unless approved by your health care provider.  · Ask your health care provider when you may return to school or work.  Eating and drinking  · Eat a balanced diet with plenty of fruits and vegetables, whole grains, and lowfat (lean) meats or other non-meat proteins (such as beans or tofu).  · Drink enough fluids to keep your urine clear or pale yellow.  · Avoid alcohol.  General instructions  · Do not share toothbrushes, nail clippers, or razors.  · Wash your hands frequently with soap and water. If soap and water are not available, use hand .  · Keep all follow-up visits as told by your health care provider. This is important.  How is this prevented?  · Get the hepatitis B vaccine. This helps prevent the hepatitis B infection.  · Wash your hands frequently with soap and water. If soap and water are not available, use hand .  · Do not share needles or syringes.  · Practice safe sex and use  condoms.  · Avoid handling blood or bodily fluids without gloves or other protection.  · Avoid getting tattoos or piercings in shops or other locations that are not clean.  Contact a health care provider if:  · You develop a rash.  · You develop jaundice, or your chronic jaundice becomes more severe.  · You have a fever.  Get help right away if:  · You are unable to eat or drink.  · You have a fever along with nausea or vomiting.  · You feel confused.  · You have trouble breathing.  · Your skin, throat, mouth, or face becomes swollen.  · You have jerky movements that you cannot control (seizure).  · You become very sleepy or have trouble waking up.  · Your stomach becomes very swollen.  Summary  · Hepatitis B is a viral infection of the liver. There are two kinds of hepatitis B: acute and chronic.  · The hepatitis B virus (HBV) can be passed from person to person (is contagious).  · You should not take any new medicines, including over-the-counter medicines for fever or pain, unless approved by your health care provider.  · To help prevent hepatitis B, wash your hands frequently with soap and water. If soap and water are not available, use hand .  This information is not intended to replace advice given to you by your health care provider. Make sure you discuss any questions you have with your health care provider.  Document Released: 12/15/2001 Document Revised: 01/23/2018 Document Reviewed: 01/23/2018  [employer] Interactive Patient Education © 2018 [employer]  Hepatitis B Antigen and Antibody Tests  Why am I having this test?  Testing for hepatitis B virus (HBV) can include checking your blood for infection by the virus (antigen) itself or for hepatitis B antibodies. Hepatitis B antibodies are infection-fighting proteins produced by your body's defense (immune) system in response to an infection with HBV. These antibodies are also produced after you have received the hepatitis B vaccine.  Different  types of HBV antibodies may be present in your blood, depending on how long it has been since infection or vaccination occurred. Different types of HBV antigens can also be detected. The hepatitis B antigen and antibody tests detect the presence of these antigens or antibodies.  These tests may be done:  · If there is concern that you have been exposed to hepatitis B.  · To diagnose hepatitis B infection.  · If you have long-term (chronic) liver disease or abnormal liver function test results. This test may help to find the cause.  · To see if you are already protected from (immune to) hepatitis B.  · To see if you need the hepatitis B vaccine to protect you from future infection.    What kind of sample is taken?  A blood sample is required for the hepatitis B antigen and antibody tests. It is usually collected by inserting a needle into a vein.  How do I prepare for this test?  There is no preparation required for this test.  What do the results mean?  It is your responsibility to obtain your test results. Ask the lab or department performing the test when and how you will get your results. Talk with your health care provider if you have any questions about your results.  The test results are based on which antibodies your blood has (is positive for) and which antibodies your blood does not have (is negative for). The test results are also based on whether hepatitis B antigen molecules are found in your blood.  Normal Test Results  The test result is normal if it matches one of these descriptions:  · Negative HBsAg and HBeAg. This means no HBV antigen is present in your blood.  · Positive HBsAb. This means you do not have an active infection but that you are protected from HBV from past exposure or from vaccination.    Abnormal Test Results  The test result is abnormal and may indicate current or recent HBV infection if it matches one of these descriptions:  · Positive HBeAg and HBsAg. This means you have a current  HBV infection.  · Positive HBVc-Ab/IgM or HBVc-Ab total. This means you have a current, previous, or long-term HBV infection.    Discuss your test results with your health care provider. He or she will use the results to make a diagnosis and determine a treatment plan that is right for you.  Talk with your health care provider to discuss your results, treatment options, and if necessary, the need for more tests. Talk with your health care provider if you have any questions about your results.  This information is not intended to replace advice given to you by your health care provider. Make sure you discuss any questions you have with your health care provider.  Document Released: 01/12/2006 Document Revised: 08/23/2017 Document Reviewed: 05/05/2015  [employer] Interactive Patient Education © 2018 [employer]    Colorectal Cancer Screening  Colorectal cancer screening is a group of tests used to check for colorectal cancer. Colorectal refers to your colon and rectum. Your colon and rectum are located at the end of your large intestine and carry your bowel movements out of your body.  Why is colorectal cancer screening done?  It is common for abnormal growths (polyps) to form in the lining of your colon, especially as you get older. These polyps can be cancerous or become cancerous. If colorectal cancer is found at an early stage, it is treatable.  Who should be screened for colorectal cancer?  Screening is recommended for all adults at average risk starting at age 50. Tests may be recommended every 1 to 10 years. Your health care provider may recommend earlier or more frequent screening if you have:  · A history of colorectal cancer or polyps.  · A family member with a history of colorectal cancer or polyps.  · Inflammatory bowel disease, such as ulcerative colitis or Crohn disease.  · A type of hereditary colon cancer syndrome.  · Colorectal cancer symptoms.    Types of screening tests  There are several types of  colorectal screening tests. They include:  · Guaiac-based fecal occult blood testing.  · Fecal immunochemical test (FIT).  · Stool DNA test.  · Barium enema.  · Virtual colonoscopy.  · Sigmoidoscopy. During this test, a sigmoidoscope is used to examine your rectum and lower colon. A sigmoidoscope is a flexible tube with a camera that is inserted through your anus into your rectum and lower colon.  · Colonoscopy. During this test, a colonoscope is used to examine your entire colon. A colonoscope is a long, thin, flexible tube with a camera. This test examines your entire colon and rectum.    This information is not intended to replace advice given to you by your health care provider. Make sure you discuss any questions you have with your health care provider.  Document Released: 06/07/2011 Document Revised: 07/27/2017 Document Reviewed: 03/26/2015  [employer] Interactive Patient Education © 2018 [employer]

## 2018-10-18 LAB
HAV AB SER QL IA: NEGATIVE
HBV CORE AB SER DONR QL IA: POSITIVE
HBV DNA SERPL NAA+PROBE-ACNC: 1110 IU/ML
HBV E AB SERPL QL IA: NEGATIVE
HBV E AG SERPL QL IA: POSITIVE
LOG10 HBV IU/ML: 3.04 LOG10 IU/ML
TEST INFORMATION: NORMAL

## 2018-10-19 LAB
HBV SURFACE AG SERPL QL IA: NORMAL
HBV SURFACE AG SERPL QL NT: POSITIVE

## 2018-10-22 LAB
HBV BCP MUT SER NAA+PROBE: NORMAL
HBV GENTYP SERPL NAA+PROBE: NORMAL

## 2018-10-23 LAB — HDV AB SER-ACNC: NEGATIVE
